# Patient Record
(demographics unavailable — no encounter records)

---

## 2024-10-18 NOTE — IMAGING
[de-identified] : L ankle:  + tenderness over ATFL, no tenderness over the lateral malleolus, PTFL or deltoid ligaments, decreased range of motion with dorsiflexion and plantarflexion due to pain, negative Masterson's test, edema noted at the lateral aspect of the ankle, neurovasc intact.  X-ray left ankle 3 views: No fractures or bony abnormalities noted.

## 2024-10-18 NOTE — IMAGING
[de-identified] : L ankle:  + tenderness over ATFL, no tenderness over the lateral malleolus, PTFL or deltoid ligaments, decreased range of motion with dorsiflexion and plantarflexion due to pain, negative Masterson's test, edema noted at the lateral aspect of the ankle, neurovasc intact.  X-ray left ankle 3 views: No fractures or bony abnormalities noted.

## 2024-10-18 NOTE — HISTORY OF PRESENT ILLNESS
[de-identified] : Jordyn is a 12 year old female who presents to the walk in accompanied by her parents for evaluation of L ankle pain s/p injury that occurred today.  She states she was playing soccer at recess when she everted the ankle.  She went to the nurse where ice was applied and the ankle was wrapped.  Patient's mom states she fractured the lateral fibula of the same ankle in April and wore a tall cam walker boot.  She put it on today after the injury and states it is helping with weightbearing as that is what was causing the most pain.  She states it hurts with certain movements and has loss of motion due to stiffness and pain.  She has not taken any medication for pain.

## 2024-10-18 NOTE — HISTORY OF PRESENT ILLNESS
[de-identified] : Jordyn is a 12 year old female who presents to the walk in accompanied by her parents for evaluation of L ankle pain s/p injury that occurred today.  She states she was playing soccer at recess when she everted the ankle.  She went to the nurse where ice was applied and the ankle was wrapped.  Patient's mom states she fractured the lateral fibula of the same ankle in April and wore a tall cam walker boot.  She put it on today after the injury and states it is helping with weightbearing as that is what was causing the most pain.  She states it hurts with certain movements and has loss of motion due to stiffness and pain.  She has not taken any medication for pain.

## 2024-10-18 NOTE — ASSESSMENT
[FreeTextEntry1] : 12-year-old female with left ankle sprain.  She will remain in a tall cam walker boot WBAT and follow-up with CRISTOF[facility] Leon in 3 weeks for reassessment.  She will use over-the-counter medication as needed mom and dad are aware if there is any issue they can contact me in the office.

## 2024-11-12 NOTE — DISCUSSION/SUMMARY
[de-identified] : At this time, patient is feeling almost 100% better.  Patient will transition out of the tall cam boot.  Mom is concerned because patient has had a few ankle injuries in the past and feels patient has an abnormal gait.  Patient will start physical therapy for strengthening of the ankle joint.  Patient will return back to gym and recreational sports as tolerated she can start attempting next week.  Can transition to an ASO ankle brace with activities.  Follow-up on as-needed basis.  Patient and mother agree to above plan all questions were answered today

## 2024-11-12 NOTE — HISTORY OF PRESENT ILLNESS
[de-identified] : 12-year-old female, accompanied by mother, presents for follow-up for left ankle.  Patient states she is feeling 95% better.  Patient has been in the tall cam boot.

## 2024-11-12 NOTE — PHYSICAL EXAM
[de-identified] : Physical exam left ankle: No erythema, ecchymosis, edema.  Mild tenderness over ATFL.  Full range of motion of ankle with no pain.  Normal gait